# Patient Record
Sex: FEMALE | Race: WHITE
[De-identification: names, ages, dates, MRNs, and addresses within clinical notes are randomized per-mention and may not be internally consistent; named-entity substitution may affect disease eponyms.]

---

## 2017-11-10 ENCOUNTER — HOSPITAL ENCOUNTER (OUTPATIENT)
Dept: HOSPITAL 61 - RAD | Age: 77
Discharge: HOME | End: 2017-11-10
Attending: INTERNAL MEDICINE
Payer: MEDICARE

## 2017-11-10 DIAGNOSIS — Z90.2: ICD-10-CM

## 2017-11-10 DIAGNOSIS — R91.1: Primary | ICD-10-CM

## 2017-11-10 PROCEDURE — 71020: CPT

## 2018-10-25 ENCOUNTER — HOSPITAL ENCOUNTER (OUTPATIENT)
Dept: HOSPITAL 61 - RAD | Age: 78
Discharge: HOME | End: 2018-10-25
Attending: INTERNAL MEDICINE
Payer: MEDICARE

## 2018-10-25 DIAGNOSIS — J98.6: ICD-10-CM

## 2018-10-25 DIAGNOSIS — R05: Primary | ICD-10-CM

## 2018-10-25 DIAGNOSIS — Z98.890: ICD-10-CM

## 2018-10-25 PROCEDURE — 71046 X-RAY EXAM CHEST 2 VIEWS: CPT

## 2018-11-01 ENCOUNTER — HOSPITAL ENCOUNTER (OUTPATIENT)
Dept: HOSPITAL 61 - CT | Age: 78
Discharge: HOME | End: 2018-11-01
Attending: INTERNAL MEDICINE
Payer: MEDICARE

## 2018-11-01 DIAGNOSIS — Z85.118: ICD-10-CM

## 2018-11-01 DIAGNOSIS — M51.35: Primary | ICD-10-CM

## 2018-11-01 DIAGNOSIS — J01.00: ICD-10-CM

## 2018-11-01 DIAGNOSIS — M47.895: ICD-10-CM

## 2018-11-01 DIAGNOSIS — R91.1: ICD-10-CM

## 2018-11-01 DIAGNOSIS — K80.20: ICD-10-CM

## 2018-11-01 PROCEDURE — 70486 CT MAXILLOFACIAL W/O DYE: CPT

## 2018-11-01 PROCEDURE — 71250 CT THORAX DX C-: CPT

## 2018-11-12 ENCOUNTER — HOSPITAL ENCOUNTER (EMERGENCY)
Dept: HOSPITAL 61 - ER | Age: 78
Discharge: HOME | End: 2018-11-12
Payer: MEDICARE

## 2018-11-12 VITALS — WEIGHT: 160 LBS | BODY MASS INDEX: 25.11 KG/M2 | HEIGHT: 67 IN

## 2018-11-12 VITALS — DIASTOLIC BLOOD PRESSURE: 75 MMHG | SYSTOLIC BLOOD PRESSURE: 150 MMHG

## 2018-11-12 DIAGNOSIS — E86.0: Primary | ICD-10-CM

## 2018-11-12 DIAGNOSIS — Z88.0: ICD-10-CM

## 2018-11-12 DIAGNOSIS — J44.9: ICD-10-CM

## 2018-11-12 DIAGNOSIS — Z85.3: ICD-10-CM

## 2018-11-12 DIAGNOSIS — R05: ICD-10-CM

## 2018-11-12 DIAGNOSIS — Z85.118: ICD-10-CM

## 2018-11-12 LAB
ALBUMIN SERPL-MCNC: 3.6 G/DL (ref 3.4–5)
ALBUMIN/GLOB SERPL: 0.9 {RATIO} (ref 1–1.7)
ALP SERPL-CCNC: 54 U/L (ref 46–116)
ALT SERPL-CCNC: 18 U/L (ref 14–59)
ANION GAP SERPL CALC-SCNC: 8 MMOL/L (ref 6–14)
APTT PPP: YELLOW S
AST SERPL-CCNC: 20 U/L (ref 15–37)
BACTERIA #/AREA URNS HPF: 0 /HPF
BASOPHILS # BLD AUTO: 0 X10^3/UL (ref 0–0.2)
BASOPHILS NFR BLD: 1 % (ref 0–3)
BILIRUB SERPL-MCNC: 0.4 MG/DL (ref 0.2–1)
BILIRUB UR QL STRIP: NEGATIVE
BUN SERPL-MCNC: 10 MG/DL (ref 7–20)
BUN/CREAT SERPL: 10 (ref 6–20)
CALCIUM SERPL-MCNC: 9.6 MG/DL (ref 8.5–10.1)
CHLORIDE SERPL-SCNC: 92 MMOL/L (ref 98–107)
CO2 SERPL-SCNC: 28 MMOL/L (ref 21–32)
CREAT SERPL-MCNC: 1 MG/DL (ref 0.6–1)
EOSINOPHIL NFR BLD: 0 X10^3/UL (ref 0–0.7)
EOSINOPHIL NFR BLD: 1 % (ref 0–3)
ERYTHROCYTE [DISTWIDTH] IN BLOOD BY AUTOMATED COUNT: 12.3 % (ref 11.5–14.5)
FIBRINOGEN PPP-MCNC: CLEAR MG/DL
GFR SERPLBLD BASED ON 1.73 SQ M-ARVRAT: 53.6 ML/MIN
GLOBULIN SER-MCNC: 3.8 G/DL (ref 2.2–3.8)
GLUCOSE SERPL-MCNC: 106 MG/DL (ref 70–99)
HCT VFR BLD CALC: 38.7 % (ref 36–47)
HGB BLD-MCNC: 13.6 G/DL (ref 12–15.5)
LIPASE: 97 U/L (ref 73–393)
LYMPHOCYTES # BLD: 0.5 X10^3/UL (ref 1–4.8)
LYMPHOCYTES NFR BLD AUTO: 9 % (ref 24–48)
MCH RBC QN AUTO: 32 PG (ref 25–35)
MCHC RBC AUTO-ENTMCNC: 35 G/DL (ref 31–37)
MCV RBC AUTO: 91 FL (ref 79–100)
MONO #: 0.5 X10^3/UL (ref 0–1.1)
MONOCYTES NFR BLD: 9 % (ref 0–9)
NEUT #: 4.3 X10^3UL (ref 1.8–7.7)
NEUTROPHILS NFR BLD AUTO: 81 % (ref 31–73)
NITRITE UR QL STRIP: NEGATIVE
PH UR STRIP: 7 [PH]
PLATELET # BLD AUTO: 285 X10^3/UL (ref 140–400)
POTASSIUM SERPL-SCNC: 4.4 MMOL/L (ref 3.5–5.1)
PROT SERPL-MCNC: 7.4 G/DL (ref 6.4–8.2)
PROT UR STRIP-MCNC: NEGATIVE MG/DL
RBC # BLD AUTO: 4.25 X10^6/UL (ref 3.5–5.4)
RBC #/AREA URNS HPF: (no result) /HPF (ref 0–2)
SODIUM SERPL-SCNC: 128 MMOL/L (ref 136–145)
SQUAMOUS #/AREA URNS LPF: (no result) /LPF
UROBILINOGEN UR-MCNC: 0.2 MG/DL
WBC # BLD AUTO: 5.4 X10^3/UL (ref 4–11)
WBC #/AREA URNS HPF: (no result) /HPF (ref 0–4)

## 2018-11-12 PROCEDURE — 93005 ELECTROCARDIOGRAM TRACING: CPT

## 2018-11-12 PROCEDURE — 85025 COMPLETE CBC W/AUTO DIFF WBC: CPT

## 2018-11-12 PROCEDURE — 81001 URINALYSIS AUTO W/SCOPE: CPT

## 2018-11-12 PROCEDURE — 80053 COMPREHEN METABOLIC PANEL: CPT

## 2018-11-12 PROCEDURE — 99285 EMERGENCY DEPT VISIT HI MDM: CPT

## 2018-11-12 PROCEDURE — 36415 COLL VENOUS BLD VENIPUNCTURE: CPT

## 2018-11-12 PROCEDURE — 96374 THER/PROPH/DIAG INJ IV PUSH: CPT

## 2018-11-12 PROCEDURE — 83690 ASSAY OF LIPASE: CPT

## 2018-11-12 PROCEDURE — 96376 TX/PRO/DX INJ SAME DRUG ADON: CPT

## 2018-11-12 PROCEDURE — 96361 HYDRATE IV INFUSION ADD-ON: CPT

## 2021-05-05 ENCOUNTER — HOSPITAL ENCOUNTER (OUTPATIENT)
Dept: HOSPITAL 63 - SURG | Age: 81
Discharge: HOME | End: 2021-05-05
Attending: ANESTHESIOLOGY
Payer: MEDICARE

## 2021-05-05 VITALS — SYSTOLIC BLOOD PRESSURE: 144 MMHG | DIASTOLIC BLOOD PRESSURE: 74 MMHG

## 2021-05-05 DIAGNOSIS — Z87.01: ICD-10-CM

## 2021-05-05 DIAGNOSIS — Z79.82: ICD-10-CM

## 2021-05-05 DIAGNOSIS — Z88.8: ICD-10-CM

## 2021-05-05 DIAGNOSIS — M48.061: ICD-10-CM

## 2021-05-05 DIAGNOSIS — M54.5: Primary | ICD-10-CM

## 2021-05-05 DIAGNOSIS — Z88.0: ICD-10-CM

## 2021-05-05 DIAGNOSIS — Z79.899: ICD-10-CM

## 2021-05-05 DIAGNOSIS — M19.90: ICD-10-CM

## 2021-05-05 DIAGNOSIS — J45.909: ICD-10-CM

## 2021-05-05 DIAGNOSIS — M47.816: ICD-10-CM

## 2021-05-05 PROCEDURE — 99204 OFFICE O/P NEW MOD 45 MIN: CPT

## 2021-05-05 PROCEDURE — G0463 HOSPITAL OUTPT CLINIC VISIT: HCPCS

## 2021-05-19 ENCOUNTER — HOSPITAL ENCOUNTER (OUTPATIENT)
Dept: HOSPITAL 63 - SURG | Age: 81
Discharge: HOME | End: 2021-05-19
Attending: ANESTHESIOLOGY
Payer: MEDICARE

## 2021-05-19 VITALS — DIASTOLIC BLOOD PRESSURE: 65 MMHG | SYSTOLIC BLOOD PRESSURE: 145 MMHG

## 2021-05-19 DIAGNOSIS — M54.16: Primary | ICD-10-CM

## 2021-05-19 DIAGNOSIS — Z90.13: ICD-10-CM

## 2021-05-19 DIAGNOSIS — Z88.8: ICD-10-CM

## 2021-05-19 DIAGNOSIS — Z87.01: ICD-10-CM

## 2021-05-19 DIAGNOSIS — Z79.899: ICD-10-CM

## 2021-05-19 DIAGNOSIS — M19.90: ICD-10-CM

## 2021-05-19 DIAGNOSIS — M48.061: ICD-10-CM

## 2021-05-19 DIAGNOSIS — Z79.82: ICD-10-CM

## 2021-05-19 DIAGNOSIS — Z88.0: ICD-10-CM

## 2021-05-19 DIAGNOSIS — J45.909: ICD-10-CM

## 2021-05-19 PROCEDURE — 45381 COLONOSCOPY SUBMUCOUS NJX: CPT

## 2021-05-19 PROCEDURE — 62323 NJX INTERLAMINAR LMBR/SAC: CPT

## 2021-05-19 PROCEDURE — 64483 NJX AA&/STRD TFRM EPI L/S 1: CPT

## 2021-06-16 ENCOUNTER — HOSPITAL ENCOUNTER (OUTPATIENT)
Dept: HOSPITAL 63 - SURG | Age: 81
Discharge: HOME | End: 2021-06-16
Attending: ANESTHESIOLOGY
Payer: MEDICARE

## 2021-06-16 VITALS — DIASTOLIC BLOOD PRESSURE: 71 MMHG | SYSTOLIC BLOOD PRESSURE: 143 MMHG

## 2021-06-16 DIAGNOSIS — Z79.82: ICD-10-CM

## 2021-06-16 DIAGNOSIS — Z79.899: ICD-10-CM

## 2021-06-16 DIAGNOSIS — Z98.890: ICD-10-CM

## 2021-06-16 DIAGNOSIS — Z87.01: ICD-10-CM

## 2021-06-16 DIAGNOSIS — M40.209: ICD-10-CM

## 2021-06-16 DIAGNOSIS — M19.90: ICD-10-CM

## 2021-06-16 DIAGNOSIS — M47.816: Primary | ICD-10-CM

## 2021-06-16 DIAGNOSIS — M48.061: ICD-10-CM

## 2021-06-16 DIAGNOSIS — Z90.13: ICD-10-CM

## 2021-06-16 DIAGNOSIS — J45.909: ICD-10-CM

## 2021-06-16 DIAGNOSIS — Z88.0: ICD-10-CM

## 2021-06-16 DIAGNOSIS — Z88.8: ICD-10-CM

## 2021-06-16 PROCEDURE — G0463 HOSPITAL OUTPT CLINIC VISIT: HCPCS

## 2021-06-16 PROCEDURE — 99214 OFFICE O/P EST MOD 30 MIN: CPT

## 2021-06-16 PROCEDURE — 72100 X-RAY EXAM L-S SPINE 2/3 VWS: CPT

## 2021-06-16 NOTE — RAD
XR L-SPINE BENDING ONLY 2-3 VIEWS



History: Reason: BACK PAIN / Spl. Instructions:  / History: 



Technique: 2 views lumbar spine flexion and extension.



Comparison: None.



Findings:

Normal vertebral body alignment with flexion and extension. Normal vertebral body height. No fracture
. Advanced multilevel degenerative disc changes most prominent L1-L2, L2-L3 and L3-L4. Facet arthropa
thy most prominent L4-5 and L5-S1.



Impression: 

1.  Advanced multilevel lumbar spondylosis.



Electronically signed by: Eric Velazco DO (6/16/2021 5:31 PM) [id number]

## 2021-09-15 ENCOUNTER — HOSPITAL ENCOUNTER (OUTPATIENT)
Dept: HOSPITAL 63 - SURG | Age: 81
Discharge: HOME | End: 2021-09-15
Attending: ANESTHESIOLOGY
Payer: MEDICARE

## 2021-09-15 VITALS
DIASTOLIC BLOOD PRESSURE: 70 MMHG | DIASTOLIC BLOOD PRESSURE: 70 MMHG | SYSTOLIC BLOOD PRESSURE: 14 MMHG | SYSTOLIC BLOOD PRESSURE: 14 MMHG

## 2021-09-15 DIAGNOSIS — J45.909: ICD-10-CM

## 2021-09-15 DIAGNOSIS — Z88.1: ICD-10-CM

## 2021-09-15 DIAGNOSIS — M79.18: ICD-10-CM

## 2021-09-15 DIAGNOSIS — M51.36: Primary | ICD-10-CM

## 2021-09-15 DIAGNOSIS — Z98.890: ICD-10-CM

## 2021-09-15 DIAGNOSIS — Z88.0: ICD-10-CM

## 2021-09-15 DIAGNOSIS — M19.90: ICD-10-CM

## 2021-09-15 DIAGNOSIS — Z79.82: ICD-10-CM

## 2021-09-15 DIAGNOSIS — Z79.899: ICD-10-CM

## 2021-09-15 DIAGNOSIS — M47.816: ICD-10-CM

## 2021-09-15 DIAGNOSIS — M41.9: ICD-10-CM

## 2021-09-15 DIAGNOSIS — M48.061: ICD-10-CM

## 2021-09-15 PROCEDURE — 64494 INJ PARAVERT F JNT L/S 2 LEV: CPT

## 2021-09-15 PROCEDURE — 64493 INJ PARAVERT F JNT L/S 1 LEV: CPT

## 2021-10-01 ENCOUNTER — HOSPITAL ENCOUNTER (OUTPATIENT)
Dept: HOSPITAL 63 - RAD | Age: 81
End: 2021-10-01
Payer: MEDICARE

## 2021-10-01 DIAGNOSIS — M25.852: ICD-10-CM

## 2021-10-01 DIAGNOSIS — M25.759: ICD-10-CM

## 2021-10-01 DIAGNOSIS — M25.851: Primary | ICD-10-CM

## 2021-10-01 PROCEDURE — 72220 X-RAY EXAM SACRUM TAILBONE: CPT

## 2021-10-01 PROCEDURE — 72202 X-RAY EXAM SI JOINTS 3/> VWS: CPT

## 2021-10-02 NOTE — RAD
INDICATION: Reason: CHRONIC LOW BACK PAIN / Spl. Instructions:  / History: 



COMPARISON: None.



IMPRESSION: 



Sacroiliac joints: 5 views obtained. Mild degenerative changes of the bilateral hips. Mild sclerosis 
at left greater than right sacroiliac joints which could be from mild degenerative changes. No defini
te acute fracture or dislocation. There are some calcific density structure seen within the bilateral
 pelvis which could be from phleboliths and vascular calcifications but cannot exclude urinary tract 
stone on plain film.



Sacrum: 3 views obtained. Degenerative changes of the pubic symphysis with osteophyte formation and s
clerosis. On the lateral view at the anterior aspect of the distal sacrum there is a subtle lucency s
een. Could be prominent cartilage in the area but would correlate with point tenderness to ensure thi
s is not from a fracture. Degenerative changes of partially visualized lumbar spine.



Electronically signed by: Herman Coyne MD (10/2/2021 9:27 AM) [id number]

## 2021-10-02 NOTE — RAD
INDICATION: Reason: CHRONIC LOW BACK PAIN / Spl. Instructions:  / History: 



COMPARISON: None.



IMPRESSION: 



Sacroiliac joints: 5 views obtained. Mild degenerative changes of the bilateral hips. Mild sclerosis 
at left greater than right sacroiliac joints which could be from mild degenerative changes. No defini
te acute fracture or dislocation. There are some calcific density structure seen within the bilateral
 pelvis which could be from phleboliths and vascular calcifications but cannot exclude urinary tract 
stone on plain film.



Sacrum: 3 views obtained. Degenerative changes of the pubic symphysis with osteophyte formation and s
clerosis. On the lateral view at the anterior aspect of the distal sacrum there is a subtle lucency s
een. Could be prominent cartilage in the area but would correlate with point tenderness to ensure thi
s is not from a fracture. Degenerative changes of partially visualized lumbar spine.



Electronically signed by: Herman Coyne MD (10/2/2021 9:27 AM) [id number]

## 2021-10-20 ENCOUNTER — HOSPITAL ENCOUNTER (OUTPATIENT)
Dept: HOSPITAL 63 - SURG | Age: 81
Discharge: HOME | End: 2021-10-20
Attending: ANESTHESIOLOGY
Payer: MEDICARE

## 2021-10-20 VITALS — DIASTOLIC BLOOD PRESSURE: 58 MMHG | SYSTOLIC BLOOD PRESSURE: 125 MMHG

## 2021-10-20 DIAGNOSIS — Z79.899: ICD-10-CM

## 2021-10-20 DIAGNOSIS — M47.816: ICD-10-CM

## 2021-10-20 DIAGNOSIS — Z88.1: ICD-10-CM

## 2021-10-20 DIAGNOSIS — Z79.82: ICD-10-CM

## 2021-10-20 DIAGNOSIS — M53.3: ICD-10-CM

## 2021-10-20 DIAGNOSIS — Z88.0: ICD-10-CM

## 2021-10-20 DIAGNOSIS — Z98.890: ICD-10-CM

## 2021-10-20 DIAGNOSIS — M41.9: ICD-10-CM

## 2021-10-20 DIAGNOSIS — M19.90: ICD-10-CM

## 2021-10-20 DIAGNOSIS — M51.36: ICD-10-CM

## 2021-10-20 DIAGNOSIS — M48.061: Primary | ICD-10-CM

## 2021-10-20 DIAGNOSIS — J45.909: ICD-10-CM

## 2021-10-20 PROCEDURE — 99213 OFFICE O/P EST LOW 20 MIN: CPT

## 2021-10-20 PROCEDURE — G0463 HOSPITAL OUTPT CLINIC VISIT: HCPCS
